# Patient Record
Sex: FEMALE | Race: WHITE | HISPANIC OR LATINO | Employment: OTHER | URBAN - METROPOLITAN AREA
[De-identification: names, ages, dates, MRNs, and addresses within clinical notes are randomized per-mention and may not be internally consistent; named-entity substitution may affect disease eponyms.]

---

## 2023-10-31 ENCOUNTER — HOSPITAL ENCOUNTER (EMERGENCY)
Facility: CLINIC | Age: 38
Discharge: HOME | End: 2023-10-31
Attending: EMERGENCY MEDICINE | Admitting: EMERGENCY MEDICINE
Payer: MEDICAID

## 2023-10-31 ENCOUNTER — APPOINTMENT (OUTPATIENT)
Dept: ULTRASOUND IMAGING | Facility: CLINIC | Age: 38
End: 2023-10-31
Attending: EMERGENCY MEDICINE
Payer: MEDICAID

## 2023-10-31 VITALS
DIASTOLIC BLOOD PRESSURE: 54 MMHG | TEMPERATURE: 98 F | RESPIRATION RATE: 18 BRPM | HEART RATE: 63 BPM | OXYGEN SATURATION: 99 % | SYSTOLIC BLOOD PRESSURE: 108 MMHG | WEIGHT: 143 LBS

## 2023-10-31 DIAGNOSIS — O20.9 VAGINAL BLEEDING AFFECTING EARLY PREGNANCY: ICD-10-CM

## 2023-10-31 LAB
ABO/RH(D): NORMAL
ANTIBODY SCREEN: NEGATIVE
BASOPHILS # BLD AUTO: 0 10E3/UL (ref 0–0.2)
BASOPHILS NFR BLD AUTO: 0 %
EOSINOPHIL # BLD AUTO: 0.1 10E3/UL (ref 0–0.7)
EOSINOPHIL NFR BLD AUTO: 2 %
ERYTHROCYTE [DISTWIDTH] IN BLOOD BY AUTOMATED COUNT: 12.9 % (ref 10–15)
HCG INTACT+B SERPL-ACNC: ABNORMAL MIU/ML
HCT VFR BLD AUTO: 38.8 % (ref 35–47)
HGB BLD-MCNC: 13.2 G/DL (ref 11.7–15.7)
IMM GRANULOCYTES # BLD: 0 10E3/UL
IMM GRANULOCYTES NFR BLD: 0 %
LYMPHOCYTES # BLD AUTO: 2.5 10E3/UL (ref 0.8–5.3)
LYMPHOCYTES NFR BLD AUTO: 33 %
MCH RBC QN AUTO: 30.8 PG (ref 26.5–33)
MCHC RBC AUTO-ENTMCNC: 34 G/DL (ref 31.5–36.5)
MCV RBC AUTO: 90 FL (ref 78–100)
MONOCYTES # BLD AUTO: 0.6 10E3/UL (ref 0–1.3)
MONOCYTES NFR BLD AUTO: 8 %
NEUTROPHILS # BLD AUTO: 4.3 10E3/UL (ref 1.6–8.3)
NEUTROPHILS NFR BLD AUTO: 57 %
NRBC # BLD AUTO: 0 10E3/UL
NRBC BLD AUTO-RTO: 0 /100
PLATELET # BLD AUTO: 226 10E3/UL (ref 150–450)
RBC # BLD AUTO: 4.29 10E6/UL (ref 3.8–5.2)
SPECIMEN EXPIRATION DATE: NORMAL
WBC # BLD AUTO: 7.5 10E3/UL (ref 4–11)

## 2023-10-31 PROCEDURE — 85025 COMPLETE CBC W/AUTO DIFF WBC: CPT | Performed by: EMERGENCY MEDICINE

## 2023-10-31 PROCEDURE — 76801 OB US < 14 WKS SINGLE FETUS: CPT

## 2023-10-31 PROCEDURE — 86900 BLOOD TYPING SEROLOGIC ABO: CPT | Performed by: EMERGENCY MEDICINE

## 2023-10-31 PROCEDURE — 36415 COLL VENOUS BLD VENIPUNCTURE: CPT | Performed by: EMERGENCY MEDICINE

## 2023-10-31 PROCEDURE — 84702 CHORIONIC GONADOTROPIN TEST: CPT | Performed by: EMERGENCY MEDICINE

## 2023-10-31 PROCEDURE — 99284 EMERGENCY DEPT VISIT MOD MDM: CPT | Mod: 25

## 2023-10-31 NOTE — LETTER
October 31, 2023      To Whom It May Concern:      Shayla Thompsondes Teena Lee was seen in our Emergency Department today, 10/31/23.  I expect her condition to improve over the next 3 days.  She may return to work in 3 days.   Sincerely,        Amelia Hernández RN

## 2023-11-01 NOTE — ED TRIAGE NOTES
Pregnant - bleeding this morning around 0300, increasing, has changed the pad once tonight. Pain in the morning, none currently. Thinks she is 5-6 wks pregnant. Had pregnancy test two weeks ago at [facility] [place].

## 2023-11-01 NOTE — ED PROVIDER NOTES
History     Chief Complaint:  Vaginal Bleeding - Pregnant       HPI   Shayla Lee is a 38 year old female presenting with vaginal bleeding in pregnancy,  was utilized through the ED phone.  She states that she has not had OB care yet, she is a G1, P0.  She believes she is approximately 6 weeks pregnant.  She states that she has had mild lower abdominal discomfort and developed vaginal bleeding today.  She denies any other symptoms other than mild nausea.  She has had no fevers.  There are no further aggravating or alleviating factors no further associated symptoms.      Independent Historian:    None    Review of External Notes:  None    Medications:    No current medications    Past Medical History:    Uterine fibroids    Past Surgical History:    Uterine fibroid removal      Physical Exam   Patient Vitals for the past 24 hrs:   BP Temp Temp src Pulse Resp SpO2 Weight   10/31/23 2010 108/54 98  F (36.7  C) Temporal 63 18 99 % 64.9 kg (143 lb)        Physical Exam  General: Alert, interactive   Head:  Scalp is atraumatic  Eyes:  The pupils are equal, round, and reactive to light    EOM's intact    No scleral icterus  ENT:      Nose:  The external nose is normal  Ears:  External ears are normal      Neck:  Normal range of motion.      There is no rigidity.    Trachea is in the midline         CV:  Regular rate and rhythm    No murmur   Resp:  Breath sounds are clear bilaterally    Non-labored, no retractions or accessory muscle use      GI:  Abdomen is soft, no distension, mild suprapubic tenderness.       MS:  Normal strength in all 4 extremities    Neuro:   Strength 5/5 x4.      GCS: 15  Psych: Awake. Alert.  Normal affect.      Appropriate interactions.    Emergency Department Course   EImaging:  US OB <14 Weeks w Transvaginal   Final Result   IMPRESSION:    1.  Single intrauterine gestational sac measuring 11 mm that would correspond to age of 5 weeks 6 days. However, the  gestational sac is irregular in appearance with apparent septation. Findings are concerning for pregnancy that will not continue.   2.  Suggest follow-up serial beta-hCG levels. Short-term repeat ultrasound in 1-2 weeks, as indicated.              Report per radiology    Laboratory:  Labs Ordered and Resulted from Time of ED Arrival to Time of ED Departure   HCG QUANTITATIVE PREGNANCY - Abnormal       Result Value    hCG Quantitative 23,775 (*)    CBC WITH PLATELETS AND DIFFERENTIAL    WBC Count 7.5      RBC Count 4.29      Hemoglobin 13.2      Hematocrit 38.8      MCV 90      MCH 30.8      MCHC 34.0      RDW 12.9      Platelet Count 226      % Neutrophils 57      % Lymphocytes 33      % Monocytes 8      % Eosinophils 2      % Basophils 0      % Immature Granulocytes 0      NRBCs per 100 WBC 0      Absolute Neutrophils 4.3      Absolute Lymphocytes 2.5      Absolute Monocytes 0.6      Absolute Eosinophils 0.1      Absolute Basophils 0.0      Absolute Immature Granulocytes 0.0      Absolute NRBCs 0.0     TYPE AND SCREEN, ADULT    ABO/RH(D) O POS      Antibody Screen Negative      SPECIMEN EXPIRATION DATE [id number]     ABO/RH TYPE AND SCREEN        Procedures   None    Emergency Department Course & Assessments:  Interventions:  Medications - No data to display     Assessments:  Patient was evaluated in the emergency department lobby given ED crowding    Independent Interpretation (X-rays, CTs, rhythm strip):  None    Consultations/Discussion of Management or Tests:  None       Social Determinants of Health affecting care:  None     Disposition:  The patient was discharged to home.     Impression & Plan    Medical Decision Making:  Following presentation history and physical examination were performed, the above work-up was undertaken.  Laboratory work-up is reassuring however ultrasound demonstrates an abnormal gestational sac.  I discussed this at length with the patient and her partner.  At present I believe  they should persist as though this is a viable pregnancy but I warned that it may not be viable.  They have care established with OB/GYN through [facility] and I recommend they follow-up within a week.  In the interim if new symptoms including pain or worsening bleeding develop they will return here.  Otherwise there is no signs of an acute infectious etiology or more concerning illness.  Laboratory work-up is otherwise reassuring.        Diagnosis:    ICD-10-CM    1. Vaginal bleeding affecting early pregnancy  O20.9                Avtar Bailey MD  10/31/2023   Avtar Bailey,*              Avtar Bailey MD  10/31/23 2347